# Patient Record
Sex: FEMALE | Race: WHITE | NOT HISPANIC OR LATINO
[De-identification: names, ages, dates, MRNs, and addresses within clinical notes are randomized per-mention and may not be internally consistent; named-entity substitution may affect disease eponyms.]

---

## 2018-09-10 PROBLEM — Z00.00 ENCOUNTER FOR PREVENTIVE HEALTH EXAMINATION: Status: ACTIVE | Noted: 2018-09-10

## 2018-10-10 ENCOUNTER — APPOINTMENT (OUTPATIENT)
Dept: ENDOCRINOLOGY | Facility: CLINIC | Age: 53
End: 2018-10-10

## 2018-11-06 ENCOUNTER — APPOINTMENT (OUTPATIENT)
Dept: ENDOCRINOLOGY | Facility: CLINIC | Age: 53
End: 2018-11-06

## 2018-12-26 ENCOUNTER — RECORD ABSTRACTING (OUTPATIENT)
Age: 53
End: 2018-12-26

## 2018-12-26 DIAGNOSIS — Z78.9 OTHER SPECIFIED HEALTH STATUS: ICD-10-CM

## 2018-12-26 DIAGNOSIS — Z86.39 PERSONAL HISTORY OF OTHER ENDOCRINE, NUTRITIONAL AND METABOLIC DISEASE: ICD-10-CM

## 2018-12-26 DIAGNOSIS — Z80.9 FAMILY HISTORY OF MALIGNANT NEOPLASM, UNSPECIFIED: ICD-10-CM

## 2018-12-26 DIAGNOSIS — R74.8 ABNORMAL LEVELS OF OTHER SERUM ENZYMES: ICD-10-CM

## 2018-12-26 DIAGNOSIS — Z83.3 FAMILY HISTORY OF DIABETES MELLITUS: ICD-10-CM

## 2018-12-26 DIAGNOSIS — Z86.59 PERSONAL HISTORY OF OTHER MENTAL AND BEHAVIORAL DISORDERS: ICD-10-CM

## 2019-01-04 ENCOUNTER — APPOINTMENT (OUTPATIENT)
Dept: ENDOCRINOLOGY | Facility: CLINIC | Age: 54
End: 2019-01-04
Payer: COMMERCIAL

## 2019-01-04 VITALS
BODY MASS INDEX: 31.28 KG/M2 | DIASTOLIC BLOOD PRESSURE: 68 MMHG | OXYGEN SATURATION: 98 % | WEIGHT: 170 LBS | HEIGHT: 62 IN | HEART RATE: 103 BPM | SYSTOLIC BLOOD PRESSURE: 112 MMHG

## 2019-01-04 LAB — GLUCOSE BLDC GLUCOMTR-MCNC: 125

## 2019-01-04 PROCEDURE — 82962 GLUCOSE BLOOD TEST: CPT

## 2019-01-04 PROCEDURE — 99214 OFFICE O/P EST MOD 30 MIN: CPT | Mod: 25

## 2019-01-04 NOTE — HISTORY OF PRESENT ILLNESS
[FreeTextEntry1] : Pt. reports that her cardiologist wants her to discontinue Actos because her brother passed away from a MI. \par \par Quality: Type 2 DM\par Severity: mild\par Duration: 2 years ago\par Onset: routine labs\par Modifying Factors: better with medication\par Associated Symptoms: neuropathy \par \par Current Regimen:\par Metformin  mg BID\par Actos 15 mg\par \par Self blood sugar monitoring: a couple times a week, no meter, no logs\par per pt. reports 120s\par denies lows\par \par exercise: none\par \par Diet:\par B- sandwich, oatmeal\par L- sandwich, whole wheat\par D- carbs, rice potatoes, pasta\par Snacks- fruits\par \par \par Date of last eye exam: 11/2018 (-) diabetic retinopathy \par Date of last foot exam: a few years ago\par Date of last flu vaccine: 2018\par Date of last Pneumovax: no

## 2019-01-04 NOTE — PHYSICAL EXAM
[Alert] : alert [Well Nourished] : well nourished [Well Developed] : well developed [EOMI] : extra ocular movement intact [Normal Hearing] : hearing was normal [Supple] : the neck was supple [No LAD] : no lymphadenopathy [Thyroid Not Enlarged] : the thyroid was not enlarged [Normal Rate and Effort] : normal respiratory rhythm and effort [No Accessory Muscle Use] : no accessory muscle use [Clear to Auscultation] : lungs were clear to auscultation bilaterally [Normal S1, S2] : normal S1 and S2 [Regular Rhythm] : with a regular rhythm [Pedal Pulses Normal] : the pedal pulses are present [Normal Bowel Sounds] : normal bowel sounds [Not Tender] : non-tender [Soft] : abdomen soft [Normal Gait] : normal gait [Acanthosis Nigricans] : no acanthosis nigricans [Right Foot Was Examined] : right foot ~C was examined [Left Foot Was Examined] : left foot ~C was examined [Normal] : normal [Full ROM] : with full range of motion [Diminished Throughout Both Feet] : normal tactile sensation with monofilament testing throughout both feet [No Tremors] : no tremors [Oriented x3] : oriented to person, place, and time [Normal Insight/Judgement] : insight and judgment were intact [de-identified] : 1+ frank ankle edema L>R

## 2019-01-04 NOTE — ASSESSMENT
[FreeTextEntry1] : 54 y/o female with Type 2 DM, Vitamin D deficiency, and Hyperlipidemia. Labs reviewed from 12/7/18- , ALT 34, chol 235, HDL 49, , TSH 2.68, vitamin D 29, and A1C 6.7. \par \par Plan:\par Type 2 DM: Discontinue Actos\par - Start Januvia 100 mg daily\par - Increase Metformin to 2000 mg daily as tolerate\par - continue self BS monitoring\par - educated on healthy food choices\par - encouraged to increase routine exercise\par \par Vitamin D Deficiency: start vitamin D supplement 1000 units daily\par \par Hyperlipidemia: pt. reports she is not taking medication consistently. Advised pt. to start taking Simvastatin daily. \par \par Labs and follow up visit in 4 months with Dr. Coats.\par \par Plan reviewed with Dr. Salvador.   [Importance of Diet and Exercise] : importance of diet and exercise to improve glycemic control, achieve weight loss and improve cardiovascular health

## 2019-01-04 NOTE — REVIEW OF SYSTEMS
[Fatigue] : fatigue [Recent Weight Gain (___ Lbs)] : recent [unfilled] ~Ulb weight gain [Palpitations] : palpitations [Headache] : headaches [Depression] : depression [Polydipsia] : polydipsia [Swelling] : swelling [Decreased Appetite] : appetite not decreased [Recent Weight Loss (___ Lbs)] : no recent weight loss [Visual Field Defect] : no visual field defect [Blurry Vision] : no blurred vision [Dysphagia] : no dysphagia [Dysphonia] : no dysphonia [Neck Pain] : no neck pain [Chest Pain] : no chest pain [SOB on Exertion] : no shortness of breath during exertion [Constipation] : no constipation [Diarrhea] : no diarrhea [Polyuria] : no polyuria [Dysuria] : no dysuria [Tremors] : no tremors [Anxiety] : no anxiety [Cold Intolerance] : cold tolerant [Heat Intolerance] : heat tolerant [Easy Bruising] : no tendency for easy bruising [FreeTextEntry2] : w [FreeTextEntry5] : with caffine [de-identified] : "I think its from the weather" [de-identified] : on Zoloft  [de-identified] : frank feet

## 2019-02-01 ENCOUNTER — MEDICATION RENEWAL (OUTPATIENT)
Age: 54
End: 2019-02-01

## 2019-02-12 ENCOUNTER — RX RENEWAL (OUTPATIENT)
Age: 54
End: 2019-02-12

## 2019-05-10 ENCOUNTER — MEDICATION RENEWAL (OUTPATIENT)
Age: 54
End: 2019-05-10

## 2019-05-10 ENCOUNTER — APPOINTMENT (OUTPATIENT)
Dept: ENDOCRINOLOGY | Facility: CLINIC | Age: 54
End: 2019-05-10
Payer: COMMERCIAL

## 2019-05-10 VITALS
BODY MASS INDEX: 30.55 KG/M2 | WEIGHT: 166 LBS | HEIGHT: 62 IN | SYSTOLIC BLOOD PRESSURE: 134 MMHG | HEART RATE: 91 BPM | DIASTOLIC BLOOD PRESSURE: 90 MMHG

## 2019-05-10 LAB
HBA1C MFR BLD HPLC: 6.2
LDLC SERPL DIRECT ASSAY-MCNC: 151

## 2019-05-10 PROCEDURE — 99214 OFFICE O/P EST MOD 30 MIN: CPT

## 2019-05-10 RX ORDER — MULTIVIT-MIN/FOLIC/VIT K/LYCOP 400-300MCG
25 MCG TABLET ORAL DAILY
Qty: 90 | Refills: 0 | Status: DISCONTINUED | COMMUNITY
Start: 2019-01-04 | End: 2019-05-10

## 2019-05-10 RX ORDER — RAMIPRIL 10 MG/1
10 CAPSULE ORAL DAILY
Qty: 90 | Refills: 3 | Status: ACTIVE | COMMUNITY
Start: 1900-01-01 | End: 1900-01-01

## 2019-05-10 RX ORDER — PIOGLITAZONE HYDROCHLORIDE 15 MG/1
15 TABLET ORAL
Refills: 0 | Status: DISCONTINUED | COMMUNITY
End: 2019-05-10

## 2019-05-10 RX ORDER — SERTRALINE HYDROCHLORIDE 50 MG/1
50 TABLET, FILM COATED ORAL
Qty: 90 | Refills: 0 | Status: DISCONTINUED | COMMUNITY
Start: 2018-12-14 | End: 2019-05-10

## 2019-05-10 NOTE — ASSESSMENT
[Importance of Diet and Exercise] : importance of diet and exercise to improve glycemic control, achieve weight loss and improve cardiovascular health [FreeTextEntry1] : well controlled diabetes\par hyperlipidemia could improve. Discussed better adherence to simvastatin including bedtime dosing\par hypertension controlled

## 2019-05-10 NOTE — HISTORY OF PRESENT ILLNESS
[FreeTextEntry1] : Pt. reports that her cardiologist wants her to discontinue Actos because her brother passed away from a MI. \par \par Quality: Type 2 DM\par Severity: mild\par Duration: 3 years ago\par Onset: routine labs\par Modifying Factors: better with medication\par Associated Symptoms: neuropathy \par \par Current Regimen:\par Metformin  mg BID\par januvia 100\par \par PMH:\par abnormal liver enzymes. Followed by hematology for phlebotomy. Negative genetic test for hemochromatosis, and absent evidence for high iron deposition on MRI\par Thyroid nodules not requiring FNA\par \par \par \par

## 2019-07-09 ENCOUNTER — MEDICATION RENEWAL (OUTPATIENT)
Age: 54
End: 2019-07-09

## 2019-10-17 ENCOUNTER — APPOINTMENT (OUTPATIENT)
Dept: ENDOCRINOLOGY | Facility: CLINIC | Age: 54
End: 2019-10-17

## 2020-09-23 ENCOUNTER — APPOINTMENT (OUTPATIENT)
Dept: ENDOCRINOLOGY | Facility: CLINIC | Age: 55
End: 2020-09-23
Payer: MEDICAID

## 2020-09-23 PROCEDURE — 99214 OFFICE O/P EST MOD 30 MIN: CPT | Mod: 95

## 2020-09-23 RX ORDER — SITAGLIPTIN 100 MG/1
100 TABLET, FILM COATED ORAL
Qty: 90 | Refills: 1 | Status: DISCONTINUED | COMMUNITY
Start: 2019-01-04 | End: 2020-09-23

## 2020-09-23 NOTE — ASSESSMENT
[FreeTextEntry1] : DM type 2, suboptimal control. Will add rybelsus or a different glp-1\par resume simvastatin

## 2020-09-23 NOTE — HISTORY OF PRESENT ILLNESS
[Home] : at home, [unfilled] , at the time of the visit. [Other Location: e.g. Home (Enter Location, City,State)___] : at [unfilled] [Verbal consent obtained from patient] : the patient, [unfilled] [FreeTextEntry1] : Pt. reports that her cardiologist wants her to discontinue Actos because her brother passed away from a MI. \par \par Quality: Type 2 DM\par Severity: mild\par Duration: 4 years ago\par Onset: routine labs\par Modifying Factors: better with medication\par Associated Symptoms: neuropathy \par \par Current Regimen:\par Metformin  mg BID\par januvia 100 - off rx\par \par PMH:\par abnormal liver enzymes. Followed by hematology for phlebotomy. Negative genetic test for hemochromatosis, and absent evidence for high iron deposition on MRI\par Thyroid nodules not requiring FNA\par \par \par \par

## 2021-03-10 LAB
HBA1C MFR BLD HPLC: 6.6
LDLC SERPL DIRECT ASSAY-MCNC: 147
MICROALBUMIN/CREAT 24H UR-RTO: 9

## 2021-03-11 ENCOUNTER — APPOINTMENT (OUTPATIENT)
Dept: ENDOCRINOLOGY | Facility: CLINIC | Age: 56
End: 2021-03-11
Payer: MEDICAID

## 2021-03-11 PROCEDURE — 99442: CPT

## 2021-03-11 NOTE — HISTORY OF PRESENT ILLNESS
[Verbal consent obtained from patient] : the patient, [unfilled] [FreeTextEntry1] : Pt. reports that her cardiologist wants her to discontinue Actos because her brother passed away from a MI. \par \par Quality: Type 2 DM\par Severity: mild\par Duration: 5 years ago\par Onset: routine labs\par Modifying Factors: better with medication\par Associated Symptoms: neuropathy \par \par Current Regimen:\par Metformin  mg BID\par januvia 100 - off rx\par rybelsus 3\par \par PMH:\par abnormal liver enzymes. Followed by hematology for phlebotomy. Negative genetic test for hemochromatosis, and absent evidence for high iron deposition on MRI\par Thyroid nodules not requiring FNA\par \par \par \par

## 2021-03-12 ENCOUNTER — RX RENEWAL (OUTPATIENT)
Age: 56
End: 2021-03-12

## 2021-07-08 LAB
HBA1C MFR BLD HPLC: 6.9
LDLC SERPL DIRECT ASSAY-MCNC: 106
MICROALBUMIN/CREAT 24H UR-RTO: 9

## 2021-07-09 ENCOUNTER — APPOINTMENT (OUTPATIENT)
Dept: ENDOCRINOLOGY | Facility: CLINIC | Age: 56
End: 2021-07-09
Payer: MEDICAID

## 2021-07-09 VITALS
DIASTOLIC BLOOD PRESSURE: 80 MMHG | OXYGEN SATURATION: 98 % | WEIGHT: 152 LBS | SYSTOLIC BLOOD PRESSURE: 135 MMHG | HEIGHT: 62 IN | BODY MASS INDEX: 27.97 KG/M2 | HEART RATE: 85 BPM

## 2021-07-09 LAB — GLUCOSE BLDC GLUCOMTR-MCNC: 108

## 2021-07-09 PROCEDURE — 82962 GLUCOSE BLOOD TEST: CPT

## 2021-07-09 PROCEDURE — 99214 OFFICE O/P EST MOD 30 MIN: CPT | Mod: 25

## 2021-07-09 NOTE — HISTORY OF PRESENT ILLNESS
[FreeTextEntry1] : Pt. reports that her cardiologist wants her to discontinue Actos because her brother passed away from a MI. \par \par Quality: Type 2 DM\par Severity: mild\par Duration: 5 years ago\par Onset: routine labs\par Modifying Factors: better with medication\par Associated Symptoms: neuropathy \par \par Current Regimen:\par Metformin  mg BID\par januvia 100 - off rx\par rybelsus 3\par \par PMH:\par abnormal liver enzymes. Followed by hematology for phlebotomy. Negative genetic test for hemochromatosis, and absent evidence for high iron deposition on MRI\par Thyroid nodules not requiring FNA\par \par \par \par

## 2021-10-06 ENCOUNTER — RX RENEWAL (OUTPATIENT)
Age: 56
End: 2021-10-06

## 2022-04-12 ENCOUNTER — RX RENEWAL (OUTPATIENT)
Age: 57
End: 2022-04-12

## 2022-06-03 ENCOUNTER — RX RENEWAL (OUTPATIENT)
Age: 57
End: 2022-06-03

## 2022-06-14 LAB
HBA1C MFR BLD HPLC: 6.7
LDLC SERPL DIRECT ASSAY-MCNC: 134
MICROALBUMIN/CREAT 24H UR-RTO: 24

## 2022-06-17 ENCOUNTER — APPOINTMENT (OUTPATIENT)
Dept: ENDOCRINOLOGY | Facility: CLINIC | Age: 57
End: 2022-06-17
Payer: MEDICAID

## 2022-06-17 VITALS
DIASTOLIC BLOOD PRESSURE: 88 MMHG | HEART RATE: 83 BPM | BODY MASS INDEX: 28.52 KG/M2 | SYSTOLIC BLOOD PRESSURE: 130 MMHG | HEIGHT: 62 IN | OXYGEN SATURATION: 98 % | WEIGHT: 155 LBS

## 2022-06-17 LAB — GLUCOSE BLDC GLUCOMTR-MCNC: 143

## 2022-06-17 PROCEDURE — 99214 OFFICE O/P EST MOD 30 MIN: CPT | Mod: 25

## 2022-06-17 PROCEDURE — 82962 GLUCOSE BLOOD TEST: CPT

## 2022-06-17 RX ORDER — SIMVASTATIN 10 MG/1
10 TABLET, FILM COATED ORAL
Qty: 90 | Refills: 3 | Status: ACTIVE | COMMUNITY
Start: 2020-09-23 | End: 1900-01-01

## 2022-06-17 NOTE — ASSESSMENT
[FreeTextEntry1] : DM type 2, good control\par hyperlipidemia, temporary lapse in simvastatin , will renew

## 2022-06-17 NOTE — HISTORY OF PRESENT ILLNESS
[FreeTextEntry1] : Pt. reports that her cardiologist wants her to discontinue Actos because her brother passed away from a MI. \par \par Quality: Type 2 DM\par Severity: mild\par Duration: 6 years ago\par Onset: routine labs\par Modifying Factors: better with medication\par Associated Symptoms: neuropathy \par \par Current Regimen:\par Metformin  mg BID\par januvia 100 - off rx\par rybelsus 3\par \par PMH:\par abnormal liver enzymes. Followed by hematology for phlebotomy. Negative genetic test for hemochromatosis, and absent evidence for high iron deposition on MRI\par Thyroid nodules not requiring FNA\par \par \par \par

## 2022-07-30 ENCOUNTER — RX RENEWAL (OUTPATIENT)
Age: 57
End: 2022-07-30

## 2022-07-30 RX ORDER — BLOOD SUGAR DIAGNOSTIC
STRIP MISCELLANEOUS
Qty: 50 | Refills: 1 | Status: ACTIVE | COMMUNITY
Start: 2021-01-12 | End: 1900-01-01

## 2022-12-16 ENCOUNTER — APPOINTMENT (OUTPATIENT)
Dept: ENDOCRINOLOGY | Facility: CLINIC | Age: 57
End: 2022-12-16

## 2023-03-06 RX ORDER — METFORMIN ER 500 MG 500 MG/1
500 TABLET ORAL
Qty: 360 | Refills: 2 | Status: ACTIVE | COMMUNITY
Start: 2019-02-12 | End: 1900-01-01

## 2023-05-17 ENCOUNTER — APPOINTMENT (OUTPATIENT)
Dept: ENDOCRINOLOGY | Facility: CLINIC | Age: 58
End: 2023-05-17

## 2023-07-21 ENCOUNTER — APPOINTMENT (OUTPATIENT)
Dept: ENDOCRINOLOGY | Facility: CLINIC | Age: 58
End: 2023-07-21

## 2023-08-24 ENCOUNTER — APPOINTMENT (OUTPATIENT)
Dept: ENDOCRINOLOGY | Facility: CLINIC | Age: 58
End: 2023-08-24
Payer: MEDICAID

## 2023-08-24 PROCEDURE — 99214 OFFICE O/P EST MOD 30 MIN: CPT | Mod: 95

## 2023-08-24 RX ORDER — ORAL SEMAGLUTIDE 3 MG/1
3 TABLET ORAL
Qty: 90 | Refills: 1 | Status: DISCONTINUED | COMMUNITY
Start: 2020-09-23 | End: 2023-08-24

## 2023-08-24 NOTE — ASSESSMENT
[FreeTextEntry1] : T2DM -Pt admits to dietary indescretion with her sons wedding -Continue  mg daily -Switch rybelsus to ozempic- begin at 0.25 mg weekly for 4 weeks then increase to 0.5 mg weekly -Restart checking sugars at least daily -Restart following a diabetic diet -Increase exercise as tolerated, goal of 30 mins a day 5x a week -Must make ophtho exam for annual screening  Abnormal TSH/MNG -Will continue to monitor TFTs, will add antibodies to next labs -Need in office sono correlating with next visit  HLD -Continue statin, watch diet, increase exercise  Vit D Def -Conitnue newly started weekly supplement  Labs before next visit RTO 3 months NP, 6 Months MD

## 2023-08-24 NOTE — HISTORY OF PRESENT ILLNESS
[Home] : at home, [unfilled] , at the time of the visit. [Other Location: e.g. Home (Enter Location, City,State)___] : at [unfilled] [Verbal consent obtained from patient] : the patient, [unfilled] [FreeTextEntry1] :    Interval history: lost to follow up since 2022 son got  june 2023  has not been following a diabetic diet   Quality: Type 2 DM  Severity: mild  Duration: 6 years ago  Onset: routine labs  Modifying Factors: better with medication  Associated Symptoms: neuropathy    Current Regimen:  Metformin  mg 1 tab daily    januvia 100 - off rx  rybelsus 3 mg- never went up    SMBG Does not check sugars regularly   PMH:  abnormal liver enzymes. Followed by hematology for phlebotomy. Negative genetic test for hemochromatosis, and absent evidence for high iron deposition on MRI  Thyroid nodules not requiring FNA Slightly abnormal TSH (stable for pt)   Vit D Def  ----Newly started on 50,000 IU supplement from OBGYN  LDL not at goal  On statin

## 2023-11-30 ENCOUNTER — APPOINTMENT (OUTPATIENT)
Dept: ENDOCRINOLOGY | Facility: CLINIC | Age: 58
End: 2023-11-30

## 2024-01-03 LAB
HBA1C MFR BLD HPLC: 6.9
LDLC SERPL DIRECT ASSAY-MCNC: 108
MICROALBUMIN/CREAT 24H UR-RTO: 5
TSH SERPL-ACNC: 2.83

## 2024-01-04 ENCOUNTER — APPOINTMENT (OUTPATIENT)
Dept: ENDOCRINOLOGY | Facility: CLINIC | Age: 59
End: 2024-01-04
Payer: MEDICAID

## 2024-01-04 VITALS
HEIGHT: 62 IN | HEART RATE: 97 BPM | DIASTOLIC BLOOD PRESSURE: 84 MMHG | OXYGEN SATURATION: 96 % | WEIGHT: 156 LBS | BODY MASS INDEX: 28.71 KG/M2 | SYSTOLIC BLOOD PRESSURE: 136 MMHG

## 2024-01-04 DIAGNOSIS — E04.2 NONTOXIC MULTINODULAR GOITER: ICD-10-CM

## 2024-01-04 DIAGNOSIS — E78.00 PURE HYPERCHOLESTEROLEMIA, UNSPECIFIED: ICD-10-CM

## 2024-01-04 DIAGNOSIS — I10 ESSENTIAL (PRIMARY) HYPERTENSION: ICD-10-CM

## 2024-01-04 DIAGNOSIS — E11.65 TYPE 2 DIABETES MELLITUS WITH HYPERGLYCEMIA: ICD-10-CM

## 2024-01-04 DIAGNOSIS — E11.9 TYPE 2 DIABETES MELLITUS W/OUT COMPLICATIONS: ICD-10-CM

## 2024-01-04 DIAGNOSIS — R79.89 OTHER SPECIFIED ABNORMAL FINDINGS OF BLOOD CHEMISTRY: ICD-10-CM

## 2024-01-04 DIAGNOSIS — E55.9 VITAMIN D DEFICIENCY, UNSPECIFIED: ICD-10-CM

## 2024-01-04 LAB — GLUCOSE BLDC GLUCOMTR-MCNC: 140

## 2024-01-04 PROCEDURE — 82962 GLUCOSE BLOOD TEST: CPT

## 2024-01-04 PROCEDURE — 76536 US EXAM OF HEAD AND NECK: CPT

## 2024-01-04 PROCEDURE — 99214 OFFICE O/P EST MOD 30 MIN: CPT | Mod: 25

## 2024-01-04 NOTE — HISTORY OF PRESENT ILLNESS
[FreeTextEntry1] :    Interval history: doing well, now on ozempic  Quality: Type 2 DM Severity: mild Duration: 6 years ago Onset: routine labs Modifying Factors: better with medication Associated Symptoms: neuropathy    Current Regimen: Metformin  mg 1 tab daily   ozempic 0.25 mg weekly- mondays- couldnt tolerate higher dosing januvia 100 - off rx    SMBG Does not check sugars regularly  On average low to mid 100s In office 140- had glass of wine last night otherwise fasting  PMH:  abnormal liver enzymes. Followed by hematology for phlebotomy. Negative genetic test for hemochromatosis, and absent evidence for high iron deposition on MRI  Thyroid nodules not requiring FNA Thyroid levels WNL  Vit D Def  ----On no vit d supplement, levels WNL   LDL not at goal  On statin but admits that she hasnt been taking reguarly

## 2024-05-02 ENCOUNTER — RX RENEWAL (OUTPATIENT)
Age: 59
End: 2024-05-02

## 2024-06-05 RX ORDER — SEMAGLUTIDE 0.68 MG/ML
2 INJECTION, SOLUTION SUBCUTANEOUS
Qty: 3 | Refills: 1 | Status: ACTIVE | COMMUNITY
Start: 2023-08-24 | End: 1900-01-01

## 2024-11-20 ENCOUNTER — RESULT CHARGE (OUTPATIENT)
Age: 59
End: 2024-11-20

## 2024-11-20 ENCOUNTER — NON-APPOINTMENT (OUTPATIENT)
Age: 59
End: 2024-11-20

## 2024-11-20 ENCOUNTER — APPOINTMENT (OUTPATIENT)
Dept: ENDOCRINOLOGY | Facility: CLINIC | Age: 59
End: 2024-11-20
Payer: MEDICAID

## 2024-11-20 VITALS
DIASTOLIC BLOOD PRESSURE: 70 MMHG | OXYGEN SATURATION: 97 % | WEIGHT: 153 LBS | SYSTOLIC BLOOD PRESSURE: 140 MMHG | HEIGHT: 62 IN | BODY MASS INDEX: 28.16 KG/M2 | HEART RATE: 99 BPM

## 2024-11-20 DIAGNOSIS — E78.00 PURE HYPERCHOLESTEROLEMIA, UNSPECIFIED: ICD-10-CM

## 2024-11-20 DIAGNOSIS — E11.65 TYPE 2 DIABETES MELLITUS WITH HYPERGLYCEMIA: ICD-10-CM

## 2024-11-20 DIAGNOSIS — I10 ESSENTIAL (PRIMARY) HYPERTENSION: ICD-10-CM

## 2024-11-20 LAB
GLUCOSE BLDC GLUCOMTR-MCNC: 230
HBA1C MFR BLD HPLC: 7.7
LDLC SERPL DIRECT ASSAY-MCNC: 177
MICROALBUMIN/CREAT 24H UR-RTO: 191
TSH SERPL-ACNC: 3.39

## 2024-11-20 PROCEDURE — 99214 OFFICE O/P EST MOD 30 MIN: CPT

## 2024-11-20 PROCEDURE — G2211 COMPLEX E/M VISIT ADD ON: CPT | Mod: NC

## 2024-11-20 PROCEDURE — 82962 GLUCOSE BLOOD TEST: CPT

## 2024-11-27 ENCOUNTER — APPOINTMENT (OUTPATIENT)
Dept: ENDOCRINOLOGY | Facility: CLINIC | Age: 59
End: 2024-11-27

## 2025-02-13 ENCOUNTER — APPOINTMENT (OUTPATIENT)
Dept: ENDOCRINOLOGY | Facility: CLINIC | Age: 60
End: 2025-02-13

## 2025-05-12 ENCOUNTER — RX RENEWAL (OUTPATIENT)
Age: 60
End: 2025-05-12

## 2025-05-13 ENCOUNTER — APPOINTMENT (OUTPATIENT)
Dept: ENDOCRINOLOGY | Facility: CLINIC | Age: 60
End: 2025-05-13

## 2025-06-24 ENCOUNTER — APPOINTMENT (OUTPATIENT)
Dept: ENDOCRINOLOGY | Facility: CLINIC | Age: 60
End: 2025-06-24

## 2025-07-09 NOTE — ASSESSMENT
Physical Therapy  [facility]  Inpatient/Observation/Outpatient Rehabilitation    Date: 2025  Patient Name: Alexi Baird       [] Inpatient Acute/Observation       [x]  Outpatient  : 2011       Plan of Care/Recert ends      [] Pt refused/declined therapy at this time due to:           [x] Pt cancelled due to:  [] No Reason Given   [] Sick/ill   [x] Other:  out of town    [] Evaluation held by RN/Provider/Physical Therapist due to:    [] High Heart Rate   [] High Blood Pressure   [] Orthopedic Consult   [] Hgb < 7   [] Other:    [] Pt ordered brace per physician request:  [] Proper fit will be completed and education for wearing/skin checks    [] Pt does not require skilled services due to:      Therapist/Assistant will attempt to see this patient, at our earliest opportunity.       Valerie Weiss Date: 2025     [FreeTextEntry1] : T2DM -Pt doing well- does admit she has room for improvement on diet after the holidays -Continue  mg daily -Continue ozempic 0.25 mg weekly- couldnt tolerate higher dose -Restart checking sugars at least daily -Restart following a diabetic diet -Increase exercise as tolerated, goal of 30 mins a day 5x a week -Must make ophtho exam for annual screening  Abnormal TSH/MNG -Will continue to monitor TFTs, wnl on last labs -Thyroid sono to be done today in office  HLD -Continue statin with 100% compliance, watch diet, increase exercise  Vit D Def -Continue on no supplement, levels wnl on labs  Labs before next visit RTO 3 months MD